# Patient Record
Sex: FEMALE | Race: WHITE
[De-identification: names, ages, dates, MRNs, and addresses within clinical notes are randomized per-mention and may not be internally consistent; named-entity substitution may affect disease eponyms.]

---

## 2017-08-21 ENCOUNTER — HOSPITAL ENCOUNTER (OUTPATIENT)
Dept: HOSPITAL 45 - C.LAB1850 | Age: 47
Discharge: HOME | End: 2017-08-21
Attending: INTERNAL MEDICINE
Payer: COMMERCIAL

## 2017-08-21 DIAGNOSIS — E03.9: Primary | ICD-10-CM

## 2017-08-21 LAB — TSH SERPL-ACNC: 0.52 UIU/ML (ref 0.3–4.5)

## 2018-02-02 ENCOUNTER — HOSPITAL ENCOUNTER (OUTPATIENT)
Dept: HOSPITAL 45 - C.MAMM | Age: 48
Discharge: HOME | End: 2018-02-02
Attending: NURSE PRACTITIONER
Payer: COMMERCIAL

## 2018-02-02 DIAGNOSIS — Z12.31: Primary | ICD-10-CM

## 2018-02-05 NOTE — MAMMOGRAPHY REPORT
BILATERAL DIGITAL SCREENING MAMMOGRAM TOMOSYNTHESIS WITH CAD: 2/2/2018

CLINICAL HISTORY: Routine screening.  The patient reported to the technologist that she has intermitt
ent left breast tenderness.  





TECHNIQUE:  Breast tomosynthesis in addition to standard 2D mammography was performed. Current study 
was also evaluated with a Computer Aided Detection (CAD) system.  



COMPARISON: Comparison is made to exams dated:  11/21/2016 mammogram, 8/13/2015 mammogram, 7/2/2014 m
ammogram, 6/24/2013 mammogram, 6/22/2012 mammogram, and 6/21/2011 mammogram - [facility]
enter.   



BREAST COMPOSITION:  There are scattered areas of fibroglandular density in both breasts.  



FINDINGS:  No suspicious masses, calcifications, or areas of architectural distortion are noted in ei
ther breast. There has been no significant interval change compared to prior exams.  





IMPRESSION:  ACR BI-RADS CATEGORY 1: NEGATIVE

There is no mammographic evidence of malignancy. A 1 year screening mammogram is recommended.  Also r
ecommend clinical follow-up for intermittent left breast pain; if the findings are suspicious clinica
lly, then further evaluation with targeted ultrasound could be performed.  



The patient will receive written notification of the results.  





Approximately 10% of breast cancers are not detected with mammography. A negative mammographic report
 should not delay biopsy if a clinically suggestive mass is present.



Nayely Rodriguez M.D.          

/:2/2/2018 15:20:26  



Imaging Technologist: [facility]

letter sent: Normal 1/2  

BI-RADS Code: ACR BI-RADS Category 1: Negative